# Patient Record
Sex: MALE | Race: BLACK OR AFRICAN AMERICAN | NOT HISPANIC OR LATINO
[De-identification: names, ages, dates, MRNs, and addresses within clinical notes are randomized per-mention and may not be internally consistent; named-entity substitution may affect disease eponyms.]

---

## 2023-02-14 ENCOUNTER — LABORATORY RESULT (OUTPATIENT)
Age: 43
End: 2023-02-14

## 2023-02-14 ENCOUNTER — APPOINTMENT (OUTPATIENT)
Dept: UROLOGY | Facility: CLINIC | Age: 43
End: 2023-02-14
Payer: MEDICAID

## 2023-02-14 VITALS
WEIGHT: 280 LBS | HEIGHT: 69 IN | BODY MASS INDEX: 41.47 KG/M2 | SYSTOLIC BLOOD PRESSURE: 135 MMHG | HEART RATE: 99 BPM | OXYGEN SATURATION: 98 % | RESPIRATION RATE: 16 BRPM | DIASTOLIC BLOOD PRESSURE: 88 MMHG

## 2023-02-14 PROBLEM — Z00.00 ENCOUNTER FOR PREVENTIVE HEALTH EXAMINATION: Status: ACTIVE | Noted: 2023-02-14

## 2023-02-14 PROCEDURE — 99204 OFFICE O/P NEW MOD 45 MIN: CPT

## 2023-02-14 PROCEDURE — 51798 US URINE CAPACITY MEASURE: CPT

## 2023-02-14 NOTE — HISTORY OF PRESENT ILLNESS
[FreeTextEntry1] : Patient Name: Horacio Degroot\par Date of Birth: [birth year]\par Contact Number: [phone]\par ------------------------------------------------------------------------------\par Date of Initial Visit: 2/14/23\par Referring Provider/PCP: none\par ------------------------------------------------------------------------------\par \par CC: testicular swelling, LUTS\par \par HPI: 42 year old reports over the last 2 years he noticed an increase in size of right testicle. Feels very self conscious about it. Reports at first had some pain, but no pain recently.  No redness. Feels heavy and somewhat uncomfortable.\par \par Patient reports over the past 2 years, he has also noticed increase in urinary frequency (every 3-4 hours), no urgency. Does not feel like empties bladder completely. Weak stream at times, good stream in early in AM. Nocturia 0. Mild straining at times. Patient reports had a UTI many years ago. No hematuria. No history of kidney stones. Most bothersome aspect of urination is feeling of incomplete emptying.\par \par Patient reports he is not currently sexually active, mostly because he is self conscious. \par \par IPSS 11, mostly dissatisfied\par ALEX 16\par \par PVR 14\par \par PMH: pre-diabetes (diet controlled)\par PSH: shoulder surgery\par Family History: uncle had cancer, unsure type, no known  malignancies\par Social: single, , cigarettes x 10 years, 1 drink/week, occasional marijuana\par Meds: none\par Allergies: NKDA, peanuts\par ROS: no fevers, chills

## 2023-02-14 NOTE — ASSESSMENT
[FreeTextEntry1] : 42 year old with significant enlargement of right scrotum -- likely consistent with hydrocele. Given size, patient reports it is uncomfortable and causing him embarrassment, and he is interested in repair if possible. No significant pain. No erythema. Will evaluate further with scrotal US.\par \par With regards to LUTS, though patient feels has increased urinary frequency and incomplete emptying, he goes every 3-4 hours and PVR is minimal. We discussed LUTS and prostatic versus bladder symptoms. Will obtain uroflow at next visit. Will discuss initiating preemptive therapy. Patient also reports difficult determining if focuses more on LUTS given enlarged scrotum.\par \par -UA\par -urine culture\par -Chlmaydia gonorrhea\par -Scrotal US\par -F/u after US for discussion of next steps\par -Uroflow next visit

## 2023-02-14 NOTE — PHYSICAL EXAM
[Urethral Meatus] : meatus normal [Penis Abnormality] : normal uncircumcised penis [General Appearance - Well Developed] : well developed [General Appearance - Well Nourished] : well nourished [Normal Appearance] : normal appearance [Well Groomed] : well groomed [General Appearance - In No Acute Distress] : no acute distress [Edema] : no peripheral edema [] : no respiratory distress [Respiration, Rhythm And Depth] : normal respiratory rhythm and effort [Exaggerated Use Of Accessory Muscles For Inspiration] : no accessory muscle use [Abdomen Soft] : soft [Abdomen Tenderness] : non-tender [Costovertebral Angle Tenderness] : no ~M costovertebral angle tenderness [Urinary Bladder Findings] : the bladder was normal on palpation [FreeTextEntry1] : R scrotum enlarged and firm, appears to be fluid filled structure surrounding testicle, unable to palpate testicular structures, L wnl; SAV limited by body habitus

## 2023-02-15 ENCOUNTER — NON-APPOINTMENT (OUTPATIENT)
Age: 43
End: 2023-02-15

## 2023-02-15 LAB
APPEARANCE: ABNORMAL
BILIRUBIN URINE: NEGATIVE
BLOOD URINE: NEGATIVE
C TRACH RRNA SPEC QL NAA+PROBE: NOT DETECTED
COLOR: YELLOW
GLUCOSE QUALITATIVE U: ABNORMAL
KETONES URINE: NEGATIVE
LEUKOCYTE ESTERASE URINE: NEGATIVE
N GONORRHOEA RRNA SPEC QL NAA+PROBE: NOT DETECTED
NITRITE URINE: NEGATIVE
PH URINE: 6
PROTEIN URINE: NORMAL
SOURCE AMPLIFICATION: NORMAL
SPECIFIC GRAVITY URINE: >=1.03
UROBILINOGEN URINE: NORMAL

## 2023-02-16 ENCOUNTER — OUTPATIENT (OUTPATIENT)
Dept: OUTPATIENT SERVICES | Facility: HOSPITAL | Age: 43
LOS: 1 days | End: 2023-02-16

## 2023-02-16 ENCOUNTER — RESULT REVIEW (OUTPATIENT)
Age: 43
End: 2023-02-16

## 2023-02-16 ENCOUNTER — APPOINTMENT (OUTPATIENT)
Dept: ULTRASOUND IMAGING | Facility: CLINIC | Age: 43
End: 2023-02-16
Payer: MEDICAID

## 2023-02-16 LAB — BACTERIA UR CULT: NORMAL

## 2023-02-16 PROCEDURE — 93975 VASCULAR STUDY: CPT | Mod: 26

## 2023-02-16 PROCEDURE — 76870 US EXAM SCROTUM: CPT | Mod: 26

## 2023-02-22 ENCOUNTER — APPOINTMENT (OUTPATIENT)
Dept: UROLOGY | Facility: CLINIC | Age: 43
End: 2023-02-22
Payer: MEDICAID

## 2023-02-22 PROCEDURE — 51736 URINE FLOW MEASUREMENT: CPT

## 2023-02-22 PROCEDURE — 99214 OFFICE O/P EST MOD 30 MIN: CPT

## 2023-02-22 NOTE — PHYSICAL EXAM
[General Appearance - Well Developed] : well developed [General Appearance - Well Nourished] : well nourished [Normal Appearance] : normal appearance [Well Groomed] : well groomed [General Appearance - In No Acute Distress] : no acute distress [Abdomen Soft] : soft [Abdomen Tenderness] : non-tender [Costovertebral Angle Tenderness] : no ~M costovertebral angle tenderness [Normal Station and Gait] : the gait and station were normal for the patient's age

## 2023-02-22 NOTE — HISTORY OF PRESENT ILLNESS
[FreeTextEntry1] : Patient Name: Horacio Degroot\par Date of Birth: [birth year]\par Contact Number: [phone]\par ------------------------------------------------------------------------------\par Date of Initial Visit: 2/14/23\par Referring Provider/PCP: Dr. Dasha Berger (fax  [phone])\par ------------------------------------------------------------------------------\par Initial HPI 2/14/23:\par \par CC: testicular swelling, LUTS\par \par HPI: 42 year old reports over the last 2 years he noticed an increase in size of right testicle. Feels very self conscious about it. Reports at first had some pain, but no pain recently. No redness. Feels heavy and somewhat uncomfortable.\par \par Patient reports over the past 2 years, he has also noticed increase in urinary frequency (every 3-4 hours), no urgency. Does not feel like empties bladder completely. Weak stream at times, good stream in early in AM. Nocturia 0. Mild straining at times. Patient reports had a UTI many years ago. No hematuria. No history of kidney stones. Most bothersome aspect of urination is feeling of incomplete emptying.\par \par Patient reports he is not currently sexually active, mostly because he is self conscious. \par \par IPSS 11, mostly dissatisfied\par ALEX 16\par \par PVR 14\par ------------------------------------------------------------------------------\par Interval History 2/22/23:\par \par UA 2/15/23: + glucose; culture <10 K arjun\par Scrotal US 2/16/23:  large simple right hydrocele, right varicocele, borderline left varicocele\par \par Patient saw PCP yesterday who is managing his diabetes, lab work done yesterday and he is following up next Tuesday.\par \par IPSS 16, mixed\par ALEX 14\par Uroflow: Qmax 22.2, voided 154, classification normal, good curve\par \par ------------------------------------------------------------------------------\par PMH: pre-diabetes (diet controlled)\par PSH: shoulder surgery\par Family History: uncle had cancer, unsure type, no known  malignancies\par Social: single, , cigarettes x 10 years, 1 drink/week, occasional marijuana\par Meds: none\par Allergies: NKDA, peanuts\par ROS: no fevers, chills

## 2023-02-22 NOTE — LETTER BODY
[Dear  ___] : Dear  [unfilled], [Courtesy Letter:] : I had the pleasure of seeing your patient, [unfilled], in my office today. [Please see my note below.] : Please see my note below. [Consult Closing:] : Thank you very much for allowing me to participate in the care of this patient.  If you have any questions, please do not hesitate to contact me. [Sincerely,] : Sincerely, [FreeTextEntry3] : Clary Stephens MD\par Director of Robotic Education\par The [facility]\par \par [email or web address]\par [phone] ([place])\par [phone]  ([facility])

## 2023-02-22 NOTE — ASSESSMENT
[FreeTextEntry1] : 42 year old with large right hydrocele, confirmed on ultrasound. Given size, patient reports heaviness and causing embarrassment. He is interested in hydrocelectomy. We discussed observation versus surgical intervention. We discussed risks of hydrocelectomy including but not limited to bleeding, infection, poor wound healing, post-operative swelling, pain, injury to surrounding structures including testicle, testicle loss/atrophy, recurrence of hydrocele. We also discussed optimizing diabetes prior to surgery to optimize wound healing. patient had + glucose in urine reestablished care with PCP - labs last week pending. Patient will keep me updated, will f/u A1c and we will plan for intervention once optimized.\par \par We also discussed incidental varicocele on US - not palpable on exam given hydrocele. Discussed association of varicocele and infertility - patient not interested in having kids at this time or further eval re sperm parameters at this time, but is aware if any issues in the future.\par \par With regards to urinary symptoms, uroflow no evidence obstruction. Patient would like to hold off on any potential medication/intervention until after DM under control and hydrocele repaired.\par \par - fu PCP re diabetes\par - hydrocelectomy after glucose control --> patient will send lab results and inform of discussion with PCP\par - patient would like to hold on intervention re urinary symptoms until after above addressed

## 2023-03-06 ENCOUNTER — NON-APPOINTMENT (OUTPATIENT)
Age: 43
End: 2023-03-06

## 2023-05-16 ENCOUNTER — APPOINTMENT (OUTPATIENT)
Dept: UROLOGY | Facility: CLINIC | Age: 43
End: 2023-05-16
Payer: MEDICAID

## 2023-05-16 VITALS — HEART RATE: 87 BPM | DIASTOLIC BLOOD PRESSURE: 74 MMHG | SYSTOLIC BLOOD PRESSURE: 116 MMHG | TEMPERATURE: 98 F

## 2023-05-16 PROCEDURE — 99214 OFFICE O/P EST MOD 30 MIN: CPT | Mod: 57

## 2023-05-17 NOTE — HISTORY OF PRESENT ILLNESS
[FreeTextEntry1] : Patient Name: Horacio Degroot\par Date of Birth: [birth year]\par Contact Number: [phone]\par ------------------------------------------------------------------------------\par Date of Initial Visit: 2/14/23\par Referring Provider/PCP: Dr. Dasha Berger (fax [phone])\par ------------------------------------------------------------------------------\par Initial HPI 2/14/23:\par \par CC: testicular swelling, LUTS\par \par HPI: 42 year old reports over the last 2 years he noticed an increase in size of right testicle. Feels very self conscious about it. Reports at first had some pain, but no pain recently. No redness. Feels heavy and somewhat uncomfortable.\par \par Patient reports over the past 2 years, he has also noticed increase in urinary frequency (every 3-4 hours), no urgency. Does not feel like empties bladder completely. Weak stream at times, good stream in early in AM. Nocturia 0. Mild straining at times. Patient reports had a UTI many years ago. No hematuria. No history of kidney stones. Most bothersome aspect of urination is feeling of incomplete emptying.\par \par Patient reports he is not currently sexually active, mostly because he is self conscious. \par \par IPSS 11, mostly dissatisfied\par ALEX 16\par \par PVR 14\par ------------------------------------------------------------------------------\par Interval History 2/22/23:\par \par UA 2/15/23: + glucose; culture <10 K arjun\par Scrotal US 2/16/23: large simple right hydrocele, right varicocele, borderline left varicocele\par \par Patient saw PCP yesterday who is managing his diabetes, lab work done yesterday and he is following up next Tuesday.\par \par IPSS 16, mixed\par ALEX 14\par Uroflow: Qmax 22.2, voided 154, classification normal, good curve\par ----------------------------------------------------------------------------------------------------------------------------------------\par Interval History (05/16/2023):\par \par Patient reports has seen PCP for diabetes management. On metformin currently. A1C was 7.5 in February before started medications, repeat A1C pending. Patient reports improvement in urinary symptoms since started metformin. He is still bothered by the hydrocele and is interested in surgical management.\par \par IPSS 1, QOL 2 --> improved with diabetes management\par ALEX 13 (but not really sexually active)\par ---------------------------------------------------------------------------------------------------------------------------------------- \par PMH: DM\par PSH: shoulder surgery\par Family History: uncle had cancer, unsure type, no known  malignancies\par Social: single, , cigarettes x 10 years, 1 drink/week, occasional marijuana\par Meds: none\par Allergies: NKDA, peanuts\par ROS: no fevers, chills \par

## 2023-05-17 NOTE — LETTER BODY
[Dear  ___] : Dear  [unfilled], [Courtesy Letter:] : I had the pleasure of seeing your patient, [unfilled], in my office today. [Please see my note below.] : Please see my note below. [Consult Closing:] : Thank you very much for allowing me to participate in the care of this patient.  If you have any questions, please do not hesitate to contact me. [Sincerely,] : Sincerely, [FreeTextEntry3] : Clary Stephens MD\par Director of Robotic Education\par [facility] at [facility]\par \par [email or web address]\par [phone] ([place])\par [phone]  ([facility])

## 2023-05-17 NOTE — ASSESSMENT
[FreeTextEntry1] : 42 year old with large right hydrocele, confirmed on ultrasound, that is very bothersome to him at this time. Given size, patient reports heaviness and causing embarrassment. We discussed the natural history of hydrocele, the options of observation, aspiration and hydrocelectomy. He is interested in hydrocelectomy. We discussed risks of hydrocelectomy including but not limited to bleeding, hematoma, infection, infected hematoma, abscess, poor wound healing/wound complications, post-operative swelling, ecchymosis, pain, injury to surrounding structures including testicle, epididymis, vasal obstruction, testicle loss/atrophy, infertility issues, recurrence of hydrocele. We also discussed optimizing diabetes prior to surgery to optimize wound healing. Patient has since started metformin and his A1C is pending - 7.5 just before starting, repeat from last week pending. Patient would like to proceed with surgery, but not until after the summer.\par \par We also discussed incidental varicocele on US - not palpable on exam given hydrocele. Discussed association of varicocele and infertility - patient not interested in having kids at this time or further eval re sperm parameters at this time, but is aware if any issues in the future.\par \par With regards to urinary symptoms, patient reports significant improvement in urinary symptoms with diabetes management. No intervention indicated at this time.\par \par - hydrocelectomy tentatively for 9/11/23\par - patient would like to return in August before surgery to touch base\par \par \par

## 2023-08-15 ENCOUNTER — APPOINTMENT (OUTPATIENT)
Dept: UROLOGY | Facility: CLINIC | Age: 43
End: 2023-08-15

## 2023-08-15 ENCOUNTER — APPOINTMENT (OUTPATIENT)
Dept: UROLOGY | Facility: CLINIC | Age: 43
End: 2023-08-15
Payer: MEDICAID

## 2023-08-15 VITALS
DIASTOLIC BLOOD PRESSURE: 76 MMHG | WEIGHT: 270 LBS | BODY MASS INDEX: 39.99 KG/M2 | OXYGEN SATURATION: 98 % | SYSTOLIC BLOOD PRESSURE: 121 MMHG | RESPIRATION RATE: 16 BRPM | HEART RATE: 80 BPM | HEIGHT: 69 IN

## 2023-08-15 DIAGNOSIS — N43.3 HYDROCELE, UNSPECIFIED: ICD-10-CM

## 2023-08-15 DIAGNOSIS — R39.9 UNSPECIFIED SYMPTOMS AND SIGNS INVOLVING THE GENITOURINARY SYSTEM: ICD-10-CM

## 2023-08-15 PROCEDURE — 99214 OFFICE O/P EST MOD 30 MIN: CPT | Mod: 57

## 2023-08-15 NOTE — LETTER BODY
[Dear  ___] : Dear  [unfilled], [Courtesy Letter:] : I had the pleasure of seeing your patient, [unfilled], in my office today. [Please see my note below.] : Please see my note below. [Consult Closing:] : Thank you very much for allowing me to participate in the care of this patient.  If you have any questions, please do not hesitate to contact me. [Sincerely,] : Sincerely, [FreeTextEntry3] : Clary Stephens MD Director of Robotic Education [facility] at [facility]  [email or web address] [phone] ([place]) [phone]  ([facility])

## 2023-08-15 NOTE — ASSESSMENT
[FreeTextEntry1] : 42 year old with large right hydrocele, confirmed on ultrasound, that is very bothersome to him at this time.  He is having difficulty moving, putting on clothes.    We discussed the natural history of hydrocele, the options of observation, aspiration and hydrocelectomy. He is interested in hydrocelectomy. We discussed risks of hydrocelectomy including but not limited to bleeding, hematoma, infection, infected hematoma, abscess, poor wound healing/wound complications, post-operative swelling, ecchymosis, pain, injury to surrounding structures including testicle, epididymis, vasal obstruction, testicle loss/atrophy, infertility issues, recurrence of hydrocele.  With regards to urinary symptoms, patient reports significant improvement in urinary symptoms with diabetes management. No intervention indicated at this time.  We also discussed PSA screening and associated guidelines. Discussed AA at higher risk of prostate cancer and pros and cons of screening. Patient interested in PSA check, but would like to hold off until after recovers from hydrocelectomy.  - RIGHT hydrocelectomy 9/11/23 - PSA screening after surgery

## 2023-08-15 NOTE — HISTORY OF PRESENT ILLNESS
[FreeTextEntry1] : Patient Name: Horacio Degroot Date of Birth: [birth year] Contact Number: [phone] ------------------------------------------------------------------------------ Date of Initial Visit: 2/14/23 Referring Provider/PCP: Dr. Dasha Berger (fax [phone]) ------------------------------------------------------------------------------ Initial HPI 2/14/23:  CC: testicular swelling, LUTS  HPI: 42 year old reports over the last 2 years he noticed an increase in size of right testicle. Feels very self conscious about it. Reports at first had some pain, but no pain recently. No redness. Feels heavy and somewhat uncomfortable.  Patient reports over the past 2 years, he has also noticed increase in urinary frequency (every 3-4 hours), no urgency. Does not feel like empties bladder completely. Weak stream at times, good stream in early in AM. Nocturia 0. Mild straining at times. Patient reports had a UTI many years ago. No hematuria. No history of kidney stones. Most bothersome aspect of urination is feeling of incomplete emptying.  Patient reports he is not currently sexually active, mostly because he is self conscious.  IPSS 11, mostly dissatisfied ALEX 16  PVR 14 ------------------------------------------------------------------------------ Interval History 2/22/23:  UA 2/15/23: + glucose; culture <10 K arjun Scrotal US 2/16/23: large simple right hydrocele, right varicocele, borderline left varicocele  Patient saw PCP yesterday who is managing his diabetes, lab work done yesterday and he is following up next Tuesday.  IPSS 16, mixed ALEX 14 Uroflow: Qmax 22.2, voided 154, classification normal, good curve ---------------------------------------------------------------------------------------------------------------------------------------- Interval History (05/16/2023):  Patient reports has seen PCP for diabetes management. On metformin currently. A1C was 7.5 in February before started medications, repeat A1C pending. Patient reports improvement in urinary symptoms since started metformin. He is still bothered by the hydrocele and is interested in surgical management.  IPSS 1, QOL 2 --> improved with diabetes management ALEX 13 (but not really sexually active) ---------------------------------------------------------------------------------------------------------------------------------------- Interval History (08/15/2023):  Patient doing well. Diabetes has been managed, believes last A1C 3 months ago was in 6s. Patient reports no issues with urination since metformin. Patient denies any scrotal pain, but is still bothered by the hydrocele, would like to proceed with repair.  IPSS 4, QOL 2 ALEX 19 ----------------------------------------------------------------------------------------------------------------------------------------  PMH: CHANEL PSH: shoulder surgery Family History: uncle had cancer, unsure type, no known  malignancies Social: single, , cigarettes x 10 years, 1 drink/week, occasional marijuana Meds: none Allergies: NKDA, peanuts ROS: no fevers, chills

## 2023-08-16 LAB
APPEARANCE: CLEAR
BACTERIA: NEGATIVE /HPF
BILIRUBIN URINE: NEGATIVE
BLOOD URINE: NEGATIVE
CAST: 0 /LPF
COLOR: YELLOW
EPITHELIAL CELLS: 0 /HPF
GLUCOSE QUALITATIVE U: NEGATIVE MG/DL
KETONES URINE: ABNORMAL MG/DL
LEUKOCYTE ESTERASE URINE: NEGATIVE
MICROSCOPIC-UA: NORMAL
NITRITE URINE: NEGATIVE
PH URINE: 5.5
PROTEIN URINE: NORMAL MG/DL
RED BLOOD CELLS URINE: 1 /HPF
SPECIFIC GRAVITY URINE: 1.03
UROBILINOGEN URINE: 1 MG/DL
WHITE BLOOD CELLS URINE: 0 /HPF

## 2023-08-17 LAB — BACTERIA UR CULT: NORMAL

## 2023-09-08 RX ORDER — ACETAMINOPHEN 500 MG
1000 TABLET ORAL ONCE
Refills: 0 | Status: COMPLETED | OUTPATIENT
Start: 2023-09-11 | End: 2023-09-11

## 2023-09-08 NOTE — ASU PATIENT PROFILE, ADULT - FALL HARM RISK - UNIVERSAL INTERVENTIONS
No Bed in lowest position, wheels locked, appropriate side rails in place/Call bell, personal items and telephone in reach/Instruct patient to call for assistance before getting out of bed or chair/Non-slip footwear when patient is out of bed/[place] to call system/Physically safe environment - no spills, clutter or unnecessary equipment/Purposeful Proactive Rounding/Room/bathroom lighting operational, light cord in reach

## 2023-09-08 NOTE — ASU PATIENT PROFILE, ADULT - ABLE TO REACH PT
Left voicemail Inform pt about surgery time 1500, last meal 0000, and last drink 1200 of water, gatorade or apple juice 3 hrs before surgery. Bring photo ID and isurance card to the first floor. Must have an escort that is 18+. Leave jewerly, piercings, and contacts at home./no

## 2023-09-10 ENCOUNTER — TRANSCRIPTION ENCOUNTER (OUTPATIENT)
Age: 43
End: 2023-09-10

## 2023-09-11 ENCOUNTER — APPOINTMENT (OUTPATIENT)
Dept: UROLOGY | Facility: HOSPITAL | Age: 43
End: 2023-09-11

## 2023-09-11 ENCOUNTER — RESULT REVIEW (OUTPATIENT)
Age: 43
End: 2023-09-11

## 2023-09-11 ENCOUNTER — TRANSCRIPTION ENCOUNTER (OUTPATIENT)
Age: 43
End: 2023-09-11

## 2023-09-11 ENCOUNTER — OUTPATIENT (OUTPATIENT)
Dept: OUTPATIENT SERVICES | Facility: HOSPITAL | Age: 43
LOS: 1 days | Discharge: HOME | End: 2023-09-11
Payer: MEDICAID

## 2023-09-11 VITALS
TEMPERATURE: 98 F | HEART RATE: 71 BPM | HEIGHT: 69 IN | OXYGEN SATURATION: 98 % | SYSTOLIC BLOOD PRESSURE: 112 MMHG | DIASTOLIC BLOOD PRESSURE: 73 MMHG | RESPIRATION RATE: 16 BRPM | WEIGHT: 256.84 LBS

## 2023-09-11 VITALS
HEART RATE: 75 BPM | TEMPERATURE: 98 F | RESPIRATION RATE: 16 BRPM | SYSTOLIC BLOOD PRESSURE: 125 MMHG | DIASTOLIC BLOOD PRESSURE: 70 MMHG | OXYGEN SATURATION: 97 %

## 2023-09-11 DIAGNOSIS — Z98.890 OTHER SPECIFIED POSTPROCEDURAL STATES: Chronic | ICD-10-CM

## 2023-09-11 LAB
GLUCOSE BLDC GLUCOMTR-MCNC: 75 MG/DL — SIGNIFICANT CHANGE UP (ref 70–99)
GLUCOSE BLDC GLUCOMTR-MCNC: 90 MG/DL — SIGNIFICANT CHANGE UP (ref 70–99)

## 2023-09-11 PROCEDURE — 55040 REMOVAL OF HYDROCELE: CPT | Mod: RT

## 2023-09-11 PROCEDURE — 88302 TISSUE EXAM BY PATHOLOGIST: CPT | Mod: 26

## 2023-09-11 RX ORDER — BENZOCAINE AND MENTHOL 5; 1 G/100ML; G/100ML
1 LIQUID ORAL ONCE
Refills: 0 | Status: COMPLETED | OUTPATIENT
Start: 2023-09-11 | End: 2023-09-11

## 2023-09-11 RX ORDER — OXYCODONE HYDROCHLORIDE 5 MG/1
1 TABLET ORAL
Qty: 6 | Refills: 0
Start: 2023-09-11

## 2023-09-11 RX ORDER — METFORMIN HYDROCHLORIDE 850 MG/1
1 TABLET ORAL
Refills: 0 | DISCHARGE

## 2023-09-11 RX ORDER — OXYCODONE HYDROCHLORIDE 5 MG/1
5 TABLET ORAL ONCE
Refills: 0 | Status: DISCONTINUED | OUTPATIENT
Start: 2023-09-11 | End: 2023-09-11

## 2023-09-11 RX ORDER — SODIUM CHLORIDE 9 MG/ML
500 INJECTION, SOLUTION INTRAVENOUS
Refills: 0 | Status: DISCONTINUED | OUTPATIENT
Start: 2023-09-11 | End: 2023-09-11

## 2023-09-11 RX ORDER — KETOROLAC TROMETHAMINE 30 MG/ML
30 SYRINGE (ML) INJECTION ONCE
Refills: 0 | Status: DISCONTINUED | OUTPATIENT
Start: 2023-09-11 | End: 2023-09-11

## 2023-09-11 RX ORDER — FENTANYL CITRATE 50 UG/ML
25 INJECTION INTRAVENOUS
Refills: 0 | Status: DISCONTINUED | OUTPATIENT
Start: 2023-09-11 | End: 2023-09-11

## 2023-09-11 RX ADMIN — OXYCODONE HYDROCHLORIDE 5 MILLIGRAM(S): 5 TABLET ORAL at 21:40

## 2023-09-11 RX ADMIN — Medication 1000 MILLIGRAM(S): at 14:21

## 2023-09-11 RX ADMIN — Medication 30 MILLIGRAM(S): at 22:07

## 2023-09-11 RX ADMIN — BENZOCAINE AND MENTHOL 1 LOZENGE: 5; 1 LIQUID ORAL at 20:39

## 2023-09-11 RX ADMIN — OXYCODONE HYDROCHLORIDE 5 MILLIGRAM(S): 5 TABLET ORAL at 21:10

## 2023-09-11 RX ADMIN — Medication 30 MILLIGRAM(S): at 21:52

## 2023-09-11 NOTE — ASU DISCHARGE PLAN (ADULT/PEDIATRIC) - CARE PROVIDER_API CALL
Clary Stephens  Urology  [address] [place] [id number]  Phone: [phone]  Fax: [phone]  Follow Up Time:

## 2023-09-11 NOTE — ASU DISCHARGE PLAN (ADULT/PEDIATRIC) - ASU DC SPECIAL INSTRUCTIONSFT
HYDROCELECTOMY    SURGICAL WOUND: There are often lumps and bumps that can be felt in the scrotum on either or both sides up to two (2) months or more post operatively. These are of no concern and with time they will soften and disappear.  Any “black and blue” bruising areas are expected and will also resolve over weeks.  Normally, there is also swelling of the scrotum post operatively. Sometimes the tissue fluid which causes the swelling migrates to the penile skin and can look alarming; with time, all the swelling will eventually subside but may take weeks.  A scrotal support and scrotal fluffs should be worn at all times for the next few weeks, unless bathing, to minimize this swelling. You may apply an ice-pack for 15 minutes out of every hour for the first 24 -36 hours to minimize pain and swelling.    STITCHES: The stitches in the incision will dissolve and fall out by themselves. Sometimes skin stitches may open, allowing a slight gaping of the incision. This is no problem if you keep the area clean.  There may be a bluish colored waterproof glue over the incision as well – the glue will peel away and fall off on its own over a couple of weeks.     PAIN: You may have some intermittent pain for up to six (6) weeks post operatively. Pain does not signify any problem unless associated with fever, chills, or inability to void.  If you experience any fevers or chills please call immediately as this may be signs of an infection. You may take Tylenol (acetaminophen) 650-975mg and/or Motrin (ibuprofen) 400-600mg, both available over the counter, for pain every 6 hours as needed. Do not exceed 4000mg of Tylenol (acetaminophen) daily. You may alternate these medications such that you take one or the other every 3 hours for around the clock pain coverage. For severe pain, take Percocet 5/325mg every 6 hours. For your convenience, all prescriptions are sent to [facility] pharmacy at [address] [place] [address], on the corner of [address].    ANTIBIOTICS: You may be given a prescription for an antibiotic, please take this medication as instructed and be sure to complete the entire course. For your convenience, all prescriptions are sent to [facility] pharmacy at [address] [place] [address], on the corner of [address].    STOOL SOFTENERS: Do not allow yourself to become constipated as straining may cause bleeding. Take stool softeners or a laxative (ex. Miralax, Colace, Senokot, ExLax, etc), available over the counter, if taking Percocet. For your convenience, all prescriptions are sent to [facility] pharmacy at [address] [place] [address] Avenue, on the corner of [address]    BATHING: You may shower 24 hours after surgery, but minimize water to the surgical incision and drain.    DIET: You may resume your regular diet and regular medication regimen.    ACTIVITY: No heavy lifting or strenuous exercise until you are evaluated at your post-operative appointment. Otherwise, you may return to your usual level of physical activity.    FOLLOW-UP: Please follow up with Dr. Stephens. If you did not already schedule your post-operative appointment, please call your urologist to schedule and follow-up appointment.    CALL YOUR UROLOGIST IF: You have any bleeding that does not stop, inability to void >8 hours, fever over 100.4 F, chills, persistent nausea/vomiting, changes in your incision concerning for infection, or if your pain is not controlled on your discharge pain medications. HYDROCELECTOMY    SURGICAL WOUND: There are often lumps and bumps that can be felt in the scrotum on either or both sides up to two (2) months or more post operatively. These are of no concern and with time they will soften and disappear.  Any “black and blue” bruising areas are expected and will also resolve over weeks.  Normally, there is also swelling of the scrotum post operatively. Sometimes the tissue fluid which causes the swelling migrates to the penile skin and can look alarming; with time, all the swelling will eventually subside but may take weeks.  A scrotal support and scrotal fluffs should be worn at all times for the next few weeks, unless bathing, to minimize this swelling. You may apply an ice-pack for 15 minutes out of every hour for the first 24 -36 hours to minimize pain and swelling.    STITCHES: The stitches in the incision will dissolve and fall out by themselves. Sometimes skin stitches may open, allowing a slight gaping of the incision. This is no problem if you keep the area clean.  There may be a bluish colored waterproof glue over the incision as well – the glue will peel away and fall off on its own over a couple of weeks.     PAIN: You may have some intermittent pain for up to six (6) weeks post operatively. Pain does not signify any problem unless associated with fever, chills, or inability to void.  If you experience any fevers or chills please call immediately as this may be signs of an infection. You may take Tylenol (acetaminophen) 650-975mg and/or Motrin (ibuprofen) 400-600mg, both available over the counter, for pain every 6 hours as needed. Do not exceed 4000mg of Tylenol (acetaminophen) daily. You may alternate these medications such that you take one or the other every 3 hours for around the clock pain coverage. For severe pain, take oxycodone 5mg every 6 hours. For your convenience, oxycodone was sent to [facility] pharmacy at [address] [place] [address], on the corner of [address].    STOOL SOFTENERS: Do not allow yourself to become constipated as straining may cause bleeding. Take stool softeners or a laxative (ex. Miralax, Colace, Senokot, ExLax, etc), available over the counter, if taking oxycodone.    BATHING: You may shower 24 hours after surgery, but minimize water to the surgical incision and drain.    DIET: You may resume your regular diet and regular medication regimen.    ACTIVITY: No heavy lifting or strenuous exercise until you are evaluated at your post-operative appointment. Otherwise, you may return to your usual level of physical activity.    FOLLOW-UP: Please follow up with Dr. Stephens. If you did not already schedule your post-operative appointment, please call your urologist to schedule and follow-up appointment.    CALL YOUR UROLOGIST IF: You have any bleeding that does not stop, inability to void >8 hours, fever over 100.4 F, chills, persistent nausea/vomiting, changes in your incision concerning for infection, or if your pain is not controlled on your discharge pain medications.

## 2023-09-12 PROBLEM — E11.9 TYPE 2 DIABETES MELLITUS WITHOUT COMPLICATIONS: Chronic | Status: ACTIVE | Noted: 2023-09-11

## 2023-09-26 LAB — SURGICAL PATHOLOGY STUDY: SIGNIFICANT CHANGE UP

## 2023-09-27 ENCOUNTER — APPOINTMENT (OUTPATIENT)
Dept: UROLOGY | Facility: CLINIC | Age: 43
End: 2023-09-27
Payer: MEDICAID

## 2023-09-27 VITALS — DIASTOLIC BLOOD PRESSURE: 73 MMHG | TEMPERATURE: 97.1 F | SYSTOLIC BLOOD PRESSURE: 125 MMHG | HEART RATE: 95 BPM

## 2023-09-27 PROCEDURE — 99024 POSTOP FOLLOW-UP VISIT: CPT

## 2023-10-25 ENCOUNTER — APPOINTMENT (OUTPATIENT)
Dept: UROLOGY | Facility: CLINIC | Age: 43
End: 2023-10-25
Payer: MEDICAID

## 2023-10-25 VITALS — DIASTOLIC BLOOD PRESSURE: 69 MMHG | HEART RATE: 82 BPM | SYSTOLIC BLOOD PRESSURE: 109 MMHG | TEMPERATURE: 98.1 F

## 2023-10-25 PROCEDURE — 99024 POSTOP FOLLOW-UP VISIT: CPT

## 2023-12-05 ENCOUNTER — APPOINTMENT (OUTPATIENT)
Dept: UROLOGY | Facility: CLINIC | Age: 43
End: 2023-12-05
Payer: MEDICAID

## 2023-12-05 VITALS
RESPIRATION RATE: 16 BRPM | HEART RATE: 86 BPM | HEIGHT: 69 IN | DIASTOLIC BLOOD PRESSURE: 68 MMHG | SYSTOLIC BLOOD PRESSURE: 107 MMHG | OXYGEN SATURATION: 98 % | WEIGHT: 250 LBS | BODY MASS INDEX: 37.03 KG/M2

## 2023-12-05 PROCEDURE — 99024 POSTOP FOLLOW-UP VISIT: CPT

## 2024-03-05 ENCOUNTER — APPOINTMENT (OUTPATIENT)
Dept: UROLOGY | Facility: CLINIC | Age: 44
End: 2024-03-05

## (undated) DEVICE — DRSG KERLIX ROLL 4.5"

## (undated) DEVICE — DRAIN PENROSE .25" X 18" LATEX

## (undated) DEVICE — SUCTION YANKAUER NO CONTROL VENT

## (undated) DEVICE — WARMING BLANKET UPPER ADULT

## (undated) DEVICE — GLV 7.5 PROTEXIS (WHITE)

## (undated) DEVICE — SLV COMPRESSION KNEE MED

## (undated) DEVICE — SUT CHROMIC 4-0 27" RB-1

## (undated) DEVICE — PACK GENERAL MINOR

## (undated) DEVICE — TUBING SUCTION NONCONDUCTIVE 6MM X 12FT

## (undated) DEVICE — SUPP ATHLETIC MALE XLG 44-55IN

## (undated) DEVICE — MARKING PEN W RULER

## (undated) DEVICE — SUT VICRYL 3-0 18" SH UNDYED (POP-OFF)